# Patient Record
Sex: MALE | Race: WHITE | NOT HISPANIC OR LATINO | Employment: UNEMPLOYED | URBAN - METROPOLITAN AREA
[De-identification: names, ages, dates, MRNs, and addresses within clinical notes are randomized per-mention and may not be internally consistent; named-entity substitution may affect disease eponyms.]

---

## 2017-04-13 ENCOUNTER — HOSPITAL ENCOUNTER (OUTPATIENT)
Dept: GENERAL RADIOLOGY | Facility: CLINIC | Age: 32
Discharge: HOME | End: 2017-04-13
Attending: INTERNAL MEDICINE | Admitting: INTERNAL MEDICINE
Payer: COMMERCIAL

## 2017-04-13 DIAGNOSIS — R13.10 DYSPHAGIA, UNSPECIFIED TYPE: ICD-10-CM

## 2017-04-13 PROCEDURE — 25500045 ZZH RX 255: Performed by: RADIOLOGY

## 2017-04-13 PROCEDURE — 74220 X-RAY XM ESOPHAGUS 1CNTRST: CPT

## 2017-04-13 RX ADMIN — ANTACID/ANTIFLATULENT 4 G: 380; 550; 10; 10 GRANULE, EFFERVESCENT ORAL at 13:43

## 2023-03-10 ENCOUNTER — OFFICE VISIT (OUTPATIENT)
Dept: URGENT CARE | Facility: URGENT CARE | Age: 38
End: 2023-03-10
Payer: COMMERCIAL

## 2023-03-10 VITALS
DIASTOLIC BLOOD PRESSURE: 75 MMHG | OXYGEN SATURATION: 100 % | WEIGHT: 173 LBS | SYSTOLIC BLOOD PRESSURE: 140 MMHG | RESPIRATION RATE: 18 BRPM | TEMPERATURE: 99.7 F | HEART RATE: 95 BPM

## 2023-03-10 DIAGNOSIS — J02.0 STREPTOCOCCAL PHARYNGITIS: Primary | ICD-10-CM

## 2023-03-10 DIAGNOSIS — R07.0 THROAT PAIN: ICD-10-CM

## 2023-03-10 LAB
DEPRECATED S PYO AG THROAT QL EIA: POSITIVE
FLUAV AG SPEC QL IA: NEGATIVE
FLUBV AG SPEC QL IA: NEGATIVE

## 2023-03-10 PROCEDURE — 87880 STREP A ASSAY W/OPTIC: CPT

## 2023-03-10 PROCEDURE — 99203 OFFICE O/P NEW LOW 30 MIN: CPT

## 2023-03-10 PROCEDURE — 87804 INFLUENZA ASSAY W/OPTIC: CPT

## 2023-03-10 RX ORDER — AMOXICILLIN 500 MG/1
1000 TABLET, FILM COATED ORAL DAILY
Qty: 20 TABLET | Refills: 0 | Status: SHIPPED | OUTPATIENT
Start: 2023-03-10 | End: 2023-03-20

## 2023-03-10 NOTE — PATIENT INSTRUCTIONS
Strep test positive for strep throat.  Take the antibiotics as prescribed and finish the full course even if symptoms get better.  Stay home activities/work for the next 24 hours while taking the antibiotics.  Try yogurt with active cultures or probiotics such as Culturelle daily to help prevent diarrhea while using antibiotics.  Get plenty of rest and drink fluids.  Can use Tylenol and/or ibuprofen as needed for pain and fever.  Maximum dose of Tylenol is 4000mg in a 24 hour period of time.  Take ibuprofen with food to avoid stomach upset.  You can also try warm salt water gargles, hot/warm water or tea with honey and/or lemon and/or Cepacol lozenges or spray for your sore throat.

## 2023-03-10 NOTE — PROGRESS NOTES
ASSESSMENT:   (J02.0) Streptococcal pharyngitis  (primary encounter diagnosis)  Plan: amoxicillin (AMOXIL) 500 MG tablet    (R07.0) Throat pain  Plan: Streptococcus A Rapid Screen w/Reflex to PCR -         Clinic Collect, Influenza A & B Antigen -         Clinic Collect    PLAN:  Informed the patient that the strep test is positive for strep throat.  Strep throat patient instructions discussed and provided.  We discussed the need to take the antibiotics as prescribed and finish the full course even if symptoms get better.  Informed the patient to stay home from activities/work for the next 24 hours while taking the antibiotics.  Informed the patient to try yogurt with active cultures or probiotics such as Culturelle daily to help prevent diarrhea while taking the antibiotic.  We also discussed the need to get plenty of rest, drink fluids and use Tylenol and or ibuprofen as needed for pain and fever with a maximum dose of Tylenol being 4000 mg in a 24-hour period of time and to take ibuprofen with food to avoid upset stomach.  We also discussed trying warm salt water gargles, hot/warm water or tea with honey and/or lemon and/or Cepacol lozenges or spray for the sore throat.  Discussed the need to return to clinic with any new or worsening symptoms. Patient acknowledged their understanding of the above plan.    The use of Dragon/[employer] dictation services may have been used to construct the content in this note; any grammatical or spelling errors are non-intentional. Please contact the author of this note directly if you are in need of any clarification.      Pillo Alva, VADIM CNP      SUBJECTIVE:   Jonnie Tejada  is a 37 year old male who is here today because of: Sore Throat.  The patient has had symptoms of fever.   Onset of symptoms was 6 days ago. Course of illness is worsening.  Patient admits to exposure to illness at home.  Patient denies cough and nasal congestion/runny nose  Treatment measures  tried include acetaminophen, ibuprofen, OTC cough drops.    At home COVID test negative.     ROS:  Negative except noted above.      OBJECTIVE:   BP (!) 140/75   Pulse 95   Temp 99.7  F (37.6  C) (Tympanic)   Resp 18   Wt 78.5 kg (173 lb)   SpO2 100%   General: healthy, alert and no distress  Eyes - conjunctivae clear.  Ears - External ears normal. Canals clear. TM's normal.  Nose/Sinuses - Nares normal.Mucosa normal. No drainage or sinus tenderness.  Oropharynx - Lips, mucosa, and tongue normal. Positive findings: oropharyngeal erythema, tonsillar hypertrophy exudates present  Neck - Neck supple; no lymphadenopathy  Lungs - Lungs clear; no wheezing or rales.  Heart - regular rate and rhythm. No murmurs, rub.    Labs:  Rapid Strep test is positive